# Patient Record
Sex: MALE | Race: WHITE | HISPANIC OR LATINO | URBAN - METROPOLITAN AREA
[De-identification: names, ages, dates, MRNs, and addresses within clinical notes are randomized per-mention and may not be internally consistent; named-entity substitution may affect disease eponyms.]

---

## 2017-07-31 ENCOUNTER — EMERGENCY (EMERGENCY)
Facility: HOSPITAL | Age: 32
LOS: 1 days | Discharge: HOME | End: 2017-07-31
Attending: EMERGENCY MEDICINE
Payer: COMMERCIAL

## 2017-07-31 VITALS
TEMPERATURE: 98 F | DIASTOLIC BLOOD PRESSURE: 75 MMHG | OXYGEN SATURATION: 99 % | HEIGHT: 71 IN | SYSTOLIC BLOOD PRESSURE: 118 MMHG | RESPIRATION RATE: 16 BRPM | HEART RATE: 69 BPM | WEIGHT: 147.93 LBS

## 2017-07-31 VITALS
DIASTOLIC BLOOD PRESSURE: 78 MMHG | RESPIRATION RATE: 16 BRPM | HEART RATE: 68 BPM | TEMPERATURE: 98 F | OXYGEN SATURATION: 99 % | SYSTOLIC BLOOD PRESSURE: 121 MMHG

## 2017-07-31 PROCEDURE — 99283 EMERGENCY DEPT VISIT LOW MDM: CPT | Mod: 25

## 2017-07-31 PROCEDURE — 71020: CPT | Mod: 26

## 2017-07-31 PROCEDURE — 99283 EMERGENCY DEPT VISIT LOW MDM: CPT

## 2017-07-31 PROCEDURE — 71046 X-RAY EXAM CHEST 2 VIEWS: CPT

## 2017-07-31 RX ORDER — METHOCARBAMOL 500 MG/1
1000 TABLET, FILM COATED ORAL ONCE
Qty: 0 | Refills: 0 | Status: COMPLETED | OUTPATIENT
Start: 2017-07-31 | End: 2017-07-31

## 2017-07-31 RX ORDER — METHOCARBAMOL 500 MG/1
2 TABLET, FILM COATED ORAL
Qty: 18 | Refills: 0 | OUTPATIENT
Start: 2017-07-31 | End: 2017-08-03

## 2017-07-31 RX ORDER — IBUPROFEN 200 MG
600 TABLET ORAL ONCE
Qty: 0 | Refills: 0 | Status: COMPLETED | OUTPATIENT
Start: 2017-07-31 | End: 2017-07-31

## 2017-07-31 RX ORDER — IBUPROFEN 200 MG
1 TABLET ORAL
Qty: 28 | Refills: 0 | OUTPATIENT
Start: 2017-07-31 | End: 2017-08-07

## 2017-07-31 RX ADMIN — Medication 600 MILLIGRAM(S): at 22:21

## 2017-07-31 RX ADMIN — METHOCARBAMOL 1000 MILLIGRAM(S): 500 TABLET, FILM COATED ORAL at 22:21
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
## 2017-07-31 NOTE — ED STATDOCS - ATTENDING CONTRIBUTION TO CARE
I, Leobardo Cam, performed the initial face to face bedside interview with this patient regarding history of present illness, review of symptoms and relevant past medical, social and family history.  I completed an independent physical examination.  I was the initial provider who evaluated this patient. I have signed out the follow up of any pending tests (i.e. labs, radiological studies) to the ACP.  I have communicated the patient’s plan of care and disposition with the ACP.  The history, relevant review of systems, past medical and surgical history, medical decision making, and physical examination was documented by the scribe in my presence and I attest to the accuracy of the documentation.

## 2017-07-31 NOTE — ED STATDOCS - PROGRESS NOTE DETAILS
Pt resting comfortably, reports significant relief of presenting symptoms. Pts cxr negative for acute pathology. Pt stable for d/c as per intake doc care plan.

## 2018-05-10 ENCOUNTER — EMERGENCY (EMERGENCY)
Facility: HOSPITAL | Age: 33
LOS: 1 days | Discharge: HOME | End: 2018-05-10
Attending: EMERGENCY MEDICINE
Payer: SELF-PAY

## 2018-05-10 VITALS — WEIGHT: 149.91 LBS | HEIGHT: 70 IN

## 2018-05-10 VITALS
OXYGEN SATURATION: 99 % | TEMPERATURE: 98 F | HEART RATE: 72 BPM | DIASTOLIC BLOOD PRESSURE: 75 MMHG | SYSTOLIC BLOOD PRESSURE: 117 MMHG | RESPIRATION RATE: 18 BRPM

## 2018-05-10 PROCEDURE — 99282 EMERGENCY DEPT VISIT SF MDM: CPT

## 2018-05-10 NOTE — ED PROVIDER NOTE - OBJECTIVE STATEMENT
31 y/o M c/o lumb on anus x 1 day.  Patient states that it's painful.  Denies abdominal pain, nausea/vomiting.  patient is able to have a bowel movement.

## 2018-05-10 NOTE — ED ADULT NURSE NOTE - OBJECTIVE STATEMENT
Patient found sitting on chair, awake, alert, and oriented times 3, breathing unlabored.  patient states " I have a lump to the right side of rectal area"  patient states started 4 days ago.  Patient states pain started today.  No drainage.  No fevers or chills

## 2018-05-10 NOTE — ED PROVIDER NOTE - MEDICAL DECISION MAKING DETAILS
1) drink 8-10 glasses of water per day 2) Metamucil 3 timers per day 3)Colace 100 mg 1-5 pills at bedtime 4) Miralax 1-3x per day 5)warm soaks with water and epsom salt 20 minutes 3x per day 6)ice packs 2-3 x per day 7)Licocaine ointment 5% 8) hydrocortisone 25 mg suppositories 2x per day for 2 weeks then 1x per day x 1 week 9)hydrocortisone 2.5% cream 2x/day for 2 weeks

## 2018-05-10 NOTE — ED PROVIDER NOTE - ATTENDING CONTRIBUTION TO CARE
32y old with perineal swelling, no surrounding erythema, plan abox, encourage po fluids, abscess care close follow up disucssed

## 2018-05-11 RX ORDER — HYDROCORTISONE 1 %
1 OINTMENT (GRAM) TOPICAL
Qty: 500 | Refills: 0 | OUTPATIENT
Start: 2018-05-11 | End: 2018-05-24

## 2018-05-11 RX ORDER — HYDROCORTISONE 1 %
1 OINTMENT (GRAM) TOPICAL
Qty: 14 | Refills: 0 | OUTPATIENT
Start: 2018-05-11 | End: 2018-05-24

## 2018-05-11 RX ORDER — DOCUSATE SODIUM 100 MG
1 CAPSULE ORAL
Qty: 28 | Refills: 0 | OUTPATIENT
Start: 2018-05-11 | End: 2018-05-24

## 2018-05-11 RX ORDER — POLYETHYLENE GLYCOL 3350 17 G/17G
17 POWDER, FOR SOLUTION ORAL
Qty: 85 | Refills: 0 | OUTPATIENT
Start: 2018-05-11 | End: 2018-05-15

## 2023-03-05 NOTE — ED ADULT TRIAGE NOTE - NSWEIGHTCALCTOOLDRUG_GEN_A_CORE
used
gen: well appearing  Mentation: AAO x 3  psych: mood appropriate  HEENT: airway patent, conjunctivae clear bilaterally  Cardio: RRR, no m/r/g  Resp: normal BS b/l  GI: soft/nondistended/nontender  Neuro: sensation and motor function grossly intact  Skin: No evidence of rash  MSK: normal movement of all extremities  Lymph/Vasc: no LE edema

## 2023-04-10 NOTE — ED PROVIDER NOTE - PSH
Face to face meeting completed with Toby Ambrosio PT regarding current status and progress of   Amna Chawla .  Alexander Benjamin, PTA      No significant past surgical history

## 2024-12-03 ENCOUNTER — RX ONLY (RX ONLY)
Age: 39
End: 2024-12-03

## 2024-12-03 ENCOUNTER — OFFICE (OUTPATIENT)
Dept: URBAN - METROPOLITAN AREA CLINIC 105 | Facility: CLINIC | Age: 39
Setting detail: OPHTHALMOLOGY
End: 2024-12-03
Payer: COMMERCIAL

## 2024-12-03 DIAGNOSIS — H00.11: ICD-10-CM

## 2024-12-03 PROCEDURE — 92285 EXTERNAL OCULAR PHOTOGRAPHY: CPT | Performed by: STUDENT IN AN ORGANIZED HEALTH CARE EDUCATION/TRAINING PROGRAM

## 2024-12-03 PROCEDURE — 67801 REMOVE EYELID LESIONS: CPT | Mod: E3 | Performed by: STUDENT IN AN ORGANIZED HEALTH CARE EDUCATION/TRAINING PROGRAM

## 2024-12-03 ASSESSMENT — KERATOMETRY
OD_K2POWER_DIOPTERS: 41.75
OD_AXISANGLE_DEGREES: 068
OS_K2POWER_DIOPTERS: 41.00
OS_K1POWER_DIOPTERS: 41.00
OD_K1POWER_DIOPTERS: 41.00
OS_AXISANGLE_DEGREES: 090

## 2024-12-03 ASSESSMENT — REFRACTION_AUTOREFRACTION
OD_AXIS: 172
OD_CYLINDER: -0.25
OS_CYLINDER: 0.00
OS_AXIS: 000
OD_SPHERE: 0.00
OS_SPHERE: -0.25

## 2024-12-03 ASSESSMENT — VISUAL ACUITY
OD_BCVA: 20/20
OS_BCVA: 20/20

## 2024-12-03 ASSESSMENT — CONFRONTATIONAL VISUAL FIELD TEST (CVF)
OD_FINDINGS: FULL
OS_FINDINGS: FULL

## 2024-12-17 ENCOUNTER — OFFICE (OUTPATIENT)
Dept: URBAN - METROPOLITAN AREA CLINIC 113 | Facility: CLINIC | Age: 39
Setting detail: OPHTHALMOLOGY
End: 2024-12-17
Payer: COMMERCIAL

## 2024-12-17 DIAGNOSIS — H00.11: ICD-10-CM

## 2024-12-17 PROCEDURE — 99213 OFFICE O/P EST LOW 20 MIN: CPT | Performed by: STUDENT IN AN ORGANIZED HEALTH CARE EDUCATION/TRAINING PROGRAM

## 2024-12-17 ASSESSMENT — REFRACTION_AUTOREFRACTION
OS_CYLINDER: 0.00
OS_SPHERE: -0.25
OD_SPHERE: 0.00
OD_AXIS: 172
OD_CYLINDER: -0.25
OS_AXIS: 000

## 2024-12-17 ASSESSMENT — KERATOMETRY
OS_K2POWER_DIOPTERS: 41.00
OS_K1POWER_DIOPTERS: 41.00
OS_AXISANGLE_DEGREES: 090
OD_K2POWER_DIOPTERS: 41.75
OD_K1POWER_DIOPTERS: 41.00
OD_AXISANGLE_DEGREES: 068

## 2024-12-17 ASSESSMENT — CONFRONTATIONAL VISUAL FIELD TEST (CVF)
OD_FINDINGS: FULL
OS_FINDINGS: FULL

## 2024-12-17 ASSESSMENT — VISUAL ACUITY
OS_BCVA: 20/20
OD_BCVA: 20/20